# Patient Record
Sex: FEMALE | Race: WHITE
[De-identification: names, ages, dates, MRNs, and addresses within clinical notes are randomized per-mention and may not be internally consistent; named-entity substitution may affect disease eponyms.]

---

## 2017-10-15 ENCOUNTER — HOSPITAL ENCOUNTER (EMERGENCY)
Dept: HOSPITAL 10 - FTE | Age: 27
Discharge: HOME | End: 2017-10-15
Payer: COMMERCIAL

## 2017-10-15 VITALS
BODY MASS INDEX: 29.36 KG/M2 | HEIGHT: 64 IN | BODY MASS INDEX: 29.36 KG/M2 | WEIGHT: 171.96 LBS | WEIGHT: 171.96 LBS | HEIGHT: 64 IN

## 2017-10-15 VITALS
TEMPERATURE: 98.2 F | SYSTOLIC BLOOD PRESSURE: 122 MMHG | HEART RATE: 78 BPM | DIASTOLIC BLOOD PRESSURE: 60 MMHG | RESPIRATION RATE: 19 BRPM
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
## 2017-10-15 DIAGNOSIS — S99.911A: Primary | ICD-10-CM
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
## 2017-10-15 PROCEDURE — 99283 EMERGENCY DEPT VISIT LOW MDM: CPT

## 2017-10-15 PROCEDURE — 73610 X-RAY EXAM OF ANKLE: CPT

## 2017-10-15 NOTE — RADRPT
PROCEDURE:   XR Right Ankle 3 Views. 

 

CLINICAL INDICATION:   Right ankle pain and trauma.

 

TECHNIQUE:   AP, oblique and lateral views of the right ankle was performed. 

 

COMPARISON:   None. 

 

FINDINGS:

The osseous structures are intact.  No destructive bony lesions are observed.  Interosseous spaces a
ppear normal.  Soft tissue swelling is seen over the lateral malleolus.

 

IMPRESSION:

Soft tissue swelling over the lateral malleolus. Ligamentous and tendinous injury is not excluded.  
If characterization of the ligaments and tendons is needed MRI is recommended. 

 

If there is high clinical suspicion for bony traumatic injury, further evaluation with CT should be 
considered. 

 

 

RPTAT: AA

_____________________________________________ 

.Dilip Coles MD, MD           Date    Time 

Electronically viewed and signed by .Dilip Coles MD, MD on 10/15/2017 08:01 

 

D:  10/15/2017 08:01  T:  10/15/2017 08:01

.P/

## 2017-10-15 NOTE — ERD
ER Documentation


Chief Complaint


Date/Time


DATE: 10/15/17 


TIME: 08:27


Chief Complaint


sp fall from 10 steps stairs, right foot pain, swelling, no loc





HPI


This 27-year-old female presents with right ankle pain after slipping down some 

stairs yesterday.  She has pain with ambulation and bruising and swelling on 

the right ankle.  She denies any foot pain or knee pain or head injury, neck 

pain or weakness.





ROS


All systems reviewed and are negative except as per history of present illness.





Medications


Home Meds


Active Scripts


Epinephrine (Epipen 2-Shahbaz) 0.3 Mg/0.3 Ml Pen.injctr, 1 EA INJ ONCE Y for 

ALLERGIC REACTION, #1 EA


   Prov:FAMILIA DORANTES NP         3/10/16


Diphenhydramine Hcl* (Benadryl*) 25 Mg Cap, 25 MG PO Q6, #30 CAP


   Prov:FAMILIA DORANTES NP         3/10/16


Acetaminophen* (Tylophen*) 500 Mg Capsule, 1 CAP PO Q6H Y for PAIN AND OR 

ELEVATED TEMP, #20 CAP


   Prov:FAMILIA DORANTES NP         3/10/16


Diphenhydramine Hcl* (Benadryl*) 25 Mg Cap, 25 MG PO Q6H Y for ITCHING, #30 CAP


   Prov:FAMILIA DORANTES NP         3/1/16


Prednisone* (Prednisone*) 50 Mg Tablet, 50 MG PO DAILY for 5 Days, TAB


   Prov:FAMILIA DORANTES NP         3/1/16


Cyclobenzaprine Hcl* (Cyclobenzaprine Hcl*) 10 Mg Tablet, 10 MG PO QHS for 7 

Days, TAB


   Prov:RAGHAVENDRA FAYE PA-C         11/24/15


Reported Medications


[none] Unknown Strength  No Conflict Check


   3/1/16


[Tylenol Cold And Flu]   No Conflict Check


   13





Allergies


Allergies:  


Coded Allergies:  


     No Known Drug Allergy (Verified  Allergy, Unknown, 10/15/17)





PMhx/Soc


Medical and Surgical Hx:  pt denies Medical Hx


History of Surgery:  Yes ( section)


Anesthesia Reaction:  No


Hx Neurological Disorder:  No


Hx Respiratory Disorders:  No


Hx Cardiac Disorders:  No


Hx Psychiatric Problems:  No


Hx Miscellaneous Medical Probl:  No


Hx Alcohol Use:  No


Hx Substance Use:  No


Hx Tobacco Use:  No


Smoking Status:  Never smoker





Physical Exam


Vitals





Vital Signs








  Date Time  Temp Pulse Resp B/P Pulse Ox O2 Delivery O2 Flow Rate FiO2


 


10/15/17 06:12 98.3 76 20 129/60 98   








Physical Exam


Const:  [], Non-ill-appearing.


Head:   Atraumatic 


Eyes:    Normal Conjunctiva


ENT:    Normal External Ears, Nose and Mouth.


Neck:               Full range of motion..~ No meningismus.


Resp:    Clear to auscultation bilaterally


Cardio:    Regular rate and rhythm, no murmurs


Abd:    Soft, non tender, non distended. Normal bowel sounds


Skin:    No petechiae or rashes


Back:    No midline or flank tenderness


Ext:    No cyanosis, or edema there is some tenderness and bruising around the 

right lateral malleolus and ankle joint.  There is no appreciable foot or 

metatarsal tenderness and no knee or tib-fib tenderness.


Neur:    Awake and alert


Psych:    Normal Mood and Affect


Results 24 hrs





 Current Medications








 Medications


  (Trade)  Dose


 Ordered  Sig/Marlena


 Route


 PRN Reason  Start Time


 Stop Time Status Last Admin


Dose Admin


 


 Ibuprofen


  (Motrin)  600 mg  ONCE  ONCE


 PO


   10/15/17 07:00


 10/15/17 07:01 DC 10/15/17 06:56


 











Procedures/MDM


X-ray right ankle 3V Interpreted by me: 


Bones:    [No fracture]


Joints:       No dislocation.  Impression-normal right ankle x-ray with only 

soft tissue swelling





Patient was placed in the right ankle Ace bandage and crutches with crutch 

training.  Patient has signs and symptoms of right ankle sprain without signs 

of fracture, dislocation, ischemia, infection, deficits.  She will be treated 

with ibuprofen, ice and elevation and primary care follow-up and return 

precautions.











RAGHAVENDRA WISEMAN MD Oct 15, 2017 08:28

## 2018-05-09 ENCOUNTER — HOSPITAL ENCOUNTER (EMERGENCY)
Age: 28
Discharge: HOME | End: 2018-05-09

## 2018-05-09 ENCOUNTER — HOSPITAL ENCOUNTER (EMERGENCY)
Dept: HOSPITAL 91 - FTE | Age: 28
Discharge: HOME | End: 2018-05-09
Payer: COMMERCIAL

## 2018-05-09 DIAGNOSIS — K62.89: Primary | ICD-10-CM

## 2018-05-09 LAB
ADD UMIC: NO
UR ASCORBIC ACID: NEGATIVE MG/DL
UR BILIRUBIN (DIP): NEGATIVE MG/DL
UR BLOOD (DIP): NEGATIVE MG/DL
UR CLARITY: CLEAR
UR COLOR: (no result)
UR GLUCOSE (DIP): NEGATIVE MG/DL
UR KETONES (DIP): NEGATIVE MG/DL
UR LEUKOCYTE ESTERASE (DIP): NEGATIVE LEU/UL
UR NITRITE (DIP): NEGATIVE MG/DL
UR PH (DIP): 8 (ref 5–9)
UR SPECIFIC GRAVITY (DIP): 1.02 (ref 1–1.03)
UR TOTAL PROTEIN (DIP): NEGATIVE MG/DL
UR UROBILINOGEN (DIP): NEGATIVE MG/DL

## 2018-05-09 PROCEDURE — 99285 EMERGENCY DEPT VISIT HI MDM: CPT

## 2018-05-09 PROCEDURE — 96372 THER/PROPH/DIAG INJ SC/IM: CPT

## 2018-05-09 PROCEDURE — 76830 TRANSVAGINAL US NON-OB: CPT

## 2018-05-09 PROCEDURE — 81025 URINE PREGNANCY TEST: CPT

## 2018-05-09 PROCEDURE — 76856 US EXAM PELVIC COMPLETE: CPT

## 2018-05-09 PROCEDURE — 81003 URINALYSIS AUTO W/O SCOPE: CPT

## 2018-05-09 RX ADMIN — KETOROLAC TROMETHAMINE 1 MG: 30 INJECTION, SOLUTION INTRAMUSCULAR at 12:29

## 2018-10-10 ENCOUNTER — HOSPITAL ENCOUNTER (EMERGENCY)
Age: 28
Discharge: HOME | End: 2018-10-10

## 2018-10-10 ENCOUNTER — HOSPITAL ENCOUNTER (EMERGENCY)
Dept: HOSPITAL 91 - FTE | Age: 28
Discharge: HOME | End: 2018-10-10
Payer: COMMERCIAL

## 2018-10-10 DIAGNOSIS — N39.0: Primary | ICD-10-CM

## 2018-10-10 LAB
URINE BLOOD (DIP) POC: (no result)
URINE LEUKOCYTE EST (DIP) POC: (no result)
URINE PH (DIP) POC: 7 (ref 5–8.5)

## 2018-10-10 PROCEDURE — 81003 URINALYSIS AUTO W/O SCOPE: CPT

## 2018-10-10 PROCEDURE — 99283 EMERGENCY DEPT VISIT LOW MDM: CPT

## 2018-10-10 PROCEDURE — 81025 URINE PREGNANCY TEST: CPT

## 2018-10-10 RX ADMIN — BUTALBITAL, ACETAMINOPHEN, AND CAFFEINE 1 TAB: 50; 325; 40 TABLET ORAL at 17:43

## 2019-07-13 ENCOUNTER — HOSPITAL ENCOUNTER (EMERGENCY)
Dept: HOSPITAL 91 - E/R | Age: 29
LOS: 1 days | Discharge: HOME | End: 2019-07-14
Payer: COMMERCIAL

## 2019-07-13 ENCOUNTER — HOSPITAL ENCOUNTER (EMERGENCY)
Dept: HOSPITAL 10 - E/R | Age: 29
LOS: 1 days | Discharge: HOME | End: 2019-07-14
Payer: COMMERCIAL

## 2019-07-13 VITALS — BODY MASS INDEX: 45.92 KG/M2 | HEIGHT: 55 IN | WEIGHT: 198.42 LBS

## 2019-07-13 DIAGNOSIS — R40.2362: ICD-10-CM

## 2019-07-13 DIAGNOSIS — Z3A.13: ICD-10-CM

## 2019-07-13 DIAGNOSIS — R40.2252: ICD-10-CM

## 2019-07-13 DIAGNOSIS — O26.891: Primary | ICD-10-CM

## 2019-07-13 DIAGNOSIS — R40.2142: ICD-10-CM

## 2019-07-13 DIAGNOSIS — R10.32: ICD-10-CM

## 2019-07-13 PROCEDURE — 84702 CHORIONIC GONADOTROPIN TEST: CPT

## 2019-07-13 PROCEDURE — 86900 BLOOD TYPING SEROLOGIC ABO: CPT

## 2019-07-13 PROCEDURE — 86901 BLOOD TYPING SEROLOGIC RH(D): CPT

## 2019-07-13 PROCEDURE — 76801 OB US < 14 WKS SINGLE FETUS: CPT

## 2019-07-13 PROCEDURE — 99284 EMERGENCY DEPT VISIT MOD MDM: CPT

## 2019-07-14 VITALS — HEART RATE: 74 BPM | RESPIRATION RATE: 20 BRPM | SYSTOLIC BLOOD PRESSURE: 99 MMHG | DIASTOLIC BLOOD PRESSURE: 72 MMHG

## 2019-07-14 NOTE — ERD
ER Documentation


Chief Complaint


Chief Complaint





nausea and intermittent vomiting for past few wks. 13 wks preg. no vb





HPI


This is a 29-year-old female who is 13 weeks pregnant and she is complaining of 


a month of off-and-on sharp left lower quadrant pain.  She has occasional 


morning sickness nausea vomiting is nonbilious nonbloody.  No diarrhea.  No 


vaginal bleeding or vaginal discharge.  No loss of water from her [place].  No 


fever no dysuria





ROS


All systems reviewed and are negative except as per history of present illness.





Medications


Home Meds


Active Scripts


Polymyxin B Sulfate-TMP* (Polymyxin B-TMP Eye Drops*) 10 Ml Drops, 1 DROP RIGHT 


EYE QID for 7 Days, EA


   Prov:CHARLEY CORMIER PA-C         10/10/18


Acetamin/Butalbital/Caffeine* (Fioricet*) [id number] Tab, 1 TAB PO Q6H PRN 


for PAIN, #7 TAB


   Prov:CHARLEY CORMIER PA-C         10/10/18


Nitrofurantoin Monohyd Macrocr* (Macrobid*) 100 Mg Capsr, 100 MG PO BID for 5 


Days, CAP


   Prov:CHARLEY CORMIER PA-C         10/10/18


Hydrocortisone Acetate (Anusol-Hc) 25 Mg Supp.rect, 1 SUPP RI QHS PRN for 


HEMORROID PAIN/ITCHING, #12 SUPP.RECT


   Prov:MARA PAREKH PA-C         18


Acetaminophen* (Tylophen*) 500 Mg Capsule, 1 CAP PO Q4 PRN for PAIN AND OR 


ELEVATED TEMP, #30 CAP


   Prov:MARA PAREKH PA-C         18


Docusate Sodium* (Colace*) 100 Mg Capsule, 100 MG PO TID, #30 CAP


   Prov:MARA PAREKH PA-C         18


Ibuprofen* (Motrin*) 600 Mg Tab, 600 MG PO Q6, #20 TAB


   Prov:RAGHAVENDRA WISEMAN MD         10/15/17


Epinephrine (Epipen 2-Shahbaz) 0.3 Mg/0.3 Ml Pen.injctr, 1 EA INJ ONCE PRN for 


ALLERGIC REACTION, #1 EA


   Prov:FAMILIA DORANTES NP         3/10/16


Diphenhydramine Hcl* (Benadryl*) 25 Mg Cap, 25 MG PO Q6, #30 CAP


   Prov:FAMILIA DORANTES NP         3/10/16


Acetaminophen* (Tylophen*) 500 Mg Capsule, 1 CAP PO Q6H PRN for PAIN AND OR 


ELEVATED TEMP, #20 CAP


   Prov:FAMILIA DORANTES. NP         3/10/16


Diphenhydramine Hcl* (Benadryl*) 25 Mg Cap, 25 MG PO Q6H PRN for ITCHING, #30 


CAP


   Prov:FAMILIA DORANTES. NP         3/1/16


Prednisone* (Prednisone*) 50 Mg Tablet, 50 MG PO DAILY for 5 Days, TAB


   Prov:FAMILIA DORANTES. NP         3/1/16


Cyclobenzaprine Hcl* (Cyclobenzaprine Hcl*) 10 Mg Tablet, 10 MG PO QHS for 7 


Days, TAB


   Prov:RAGHAVENDRA FAYE PA-C         11/24/15


Reported Medications


[none] Unknown Strength  No Conflict Check


   3/1/16


[Tylenol Cold And Flu]   No Conflict Check


   13





Allergies


Allergies:  


Coded Allergies:  


     No Known Drug Allergy (Verified  Allergy, Unknown, 10/10/18)





PMhx/Soc


History of Surgery:  Yes ( X2)


Anesthesia Reaction:  No


Hx Neurological Disorder:  No


Hx Respiratory Disorders:  No


Hx Cardiac Disorders:  No


Hx Psychiatric Problems:  Yes (anxiety)


Hx Miscellaneous Medical Probl:  No


Hx Alcohol Use:  No


Hx Substance Use:  No


Hx Tobacco Use:  No


Smoking Status:  Never smoker





FmHx


Family History:  No coronary disease





Physical Exam


Vitals





Vital Signs


  Date      Temp  Pulse  Resp  B/P (MAP)   Pulse Ox  O2          O2 Flow    FiO2


Time                                                 Delivery    Rate


   19           78    26      113/69       100  Room Air


     23:30                           (84)


   19           77    22      106/74       100  Room Air


     23:00                           (85)


   19  98.3     91    18      130/88        98


     21:53                          (102)





Physical Exam


 Const:      Well-developed, well-nourished


 Head:        Atraumatic, normocephalic


 Eyes:       Normal Conjunctiva, PERRLA, EOMI, normal sclera, no nystagmus


 ENT:         Normal External Ears, Nose and Mouth, moist mucus membranes.


 Neck:        Full range of motion.  No meningismus, no lymphadenopathy.


 Resp:         Clear to auscultation bilaterally, no wheezing, rhonchi, rales


 Cardio:       Regular rate and rhythm, no murmurs, S1 S2 present


 Abd:         Soft, mild left lower quadrant tenderness/adnexal, non distended. 


Normal bowel sounds, no guarding or rebound, no pulsitile abdominal masses or 


bruits


 Skin:         No petechiae or rashes, no ecchymosis , no maculopapular rash


 Back:        No midline or flank tenderness


 Ext:          No cyanosis, or edema, FROM x 4, normal inspection, 


neurovascularly intact x 4


 Neur:        Awake and alert, STR 5/5 x 4, sensation intact x 4, no focal 


findings, cerebellum intact


 Psych:        Normal Mood and Affect





Procedures/[place] [id number]


                        Radiology Main Line: [phone]





                            DIAGNOSTIC IMAGING REPORT





Patient: TYLER PATEL   : 1990   Age: 29  Sex: F                      


 


       MR #:    [id number]   Acct #:   [id number]    DOS: 19 2318


Ordering MD: THEA ATWOOD DO   Location:  E/R   Room/Bed:                


                           


                                        


PROCEDURE:   US OB. 


 


CLINICAL INDICATION:   pain 


 


TECHNIQUE:   Transabdominal  views of the pelvis are available for review. 


 


COMPARISON:   No prior studies are available for comparison. 


 


FINDINGS:


 


There is a single intrauterine gestation with the crown-rump length measuring 


6.7 cm, corresponding to a gestational age of 13 weeks and 0 days.  


The fetal heart rate is noted at 154 bpm. 


The ovaries are normal in size and echogenicity. Normal Doppler flow is 


identified in both ovaries.


The right ovary measures 2.4 x 1.9 cm.


The left ovary measures 3.0 x 1.2 cm.


There is no free fluid.  


 


RPTAT: AA


 


IMPRESSION:


 


Single live intrauterine pregnancy with an estimated gestational age of 13 weeks


and 0 days, based on ultrasound measurements.


NANCY based on ultrasound measurements is 20.


_____________________________________________ 


.Vinny Bobby MD, MD           Date    Time 


Electronically viewed and signed by .Vinny Bobby MD, MD on 2019 


00:25 


 


D:  2019 00:25  T:  2019 00:25


.S/





CC: THEA ATWOOD DO





[phone]




















Patient is having likely some ligament pain from pregnancy.  She is not having 


any miscarriage at this time there is a normal IUP with no vaginal bleeding at 


all.  I have her to bed rest for a few days and follow-up with her OB





Departure


Diagnosis:  


   Primary Impression:  


   Pregnancy related abdominal pain of lower quadrant, antepartum


Condition:  Stable











THEA ATWOOD DO         2019 00:32